# Patient Record
Sex: MALE | Race: WHITE | Employment: FULL TIME | URBAN - METROPOLITAN AREA
[De-identification: names, ages, dates, MRNs, and addresses within clinical notes are randomized per-mention and may not be internally consistent; named-entity substitution may affect disease eponyms.]

---

## 2020-07-29 ENCOUNTER — HOSPITAL ENCOUNTER (EMERGENCY)
Age: 35
Discharge: HOME | End: 2020-07-29
Attending: STUDENT IN AN ORGANIZED HEALTH CARE EDUCATION/TRAINING PROGRAM
Payer: COMMERCIAL

## 2020-07-29 VITALS
TEMPERATURE: 98.4 F | RESPIRATION RATE: 22 BRPM | BODY MASS INDEX: 23.01 KG/M2 | DIASTOLIC BLOOD PRESSURE: 64 MMHG | SYSTOLIC BLOOD PRESSURE: 108 MMHG | OXYGEN SATURATION: 97 % | HEIGHT: 70 IN | HEART RATE: 65 BPM | WEIGHT: 160.72 LBS

## 2020-07-29 DIAGNOSIS — T78.2XXA ANAPHYLAXIS, INITIAL ENCOUNTER: Primary | ICD-10-CM

## 2020-07-29 LAB
ALBUMIN SERPL-MCNC: 4.2 G/DL (ref 3.5–5)
ALBUMIN/GLOB SERPL: 1.2 {RATIO} (ref 1.1–2.2)
ALP SERPL-CCNC: 42 U/L (ref 45–117)
ALT SERPL-CCNC: 41 U/L (ref 12–78)
ANION GAP SERPL CALC-SCNC: 11 MMOL/L (ref 5–15)
AST SERPL-CCNC: 20 U/L (ref 15–37)
BASOPHILS # BLD: 0.1 K/UL (ref 0–0.1)
BASOPHILS NFR BLD: 1 % (ref 0–1)
BILIRUB SERPL-MCNC: 0.4 MG/DL (ref 0.2–1)
BUN SERPL-MCNC: 17 MG/DL (ref 6–20)
BUN/CREAT SERPL: 17 (ref 12–20)
CALCIUM SERPL-MCNC: 9.2 MG/DL (ref 8.5–10.1)
CHLORIDE SERPL-SCNC: 103 MMOL/L (ref 97–108)
CO2 SERPL-SCNC: 29 MMOL/L (ref 21–32)
CREAT SERPL-MCNC: 1.01 MG/DL (ref 0.7–1.3)
DIFFERENTIAL METHOD BLD: NORMAL
EOSINOPHIL # BLD: 0.3 K/UL (ref 0–0.4)
EOSINOPHIL NFR BLD: 3 % (ref 0–7)
ERYTHROCYTE [DISTWIDTH] IN BLOOD BY AUTOMATED COUNT: 11.9 % (ref 11.5–14.5)
GLOBULIN SER CALC-MCNC: 3.5 G/DL (ref 2–4)
GLUCOSE SERPL-MCNC: 101 MG/DL (ref 65–100)
HCT VFR BLD AUTO: 44.6 % (ref 36.6–50.3)
HGB BLD-MCNC: 15.2 G/DL (ref 12.1–17)
IMM GRANULOCYTES # BLD AUTO: 0 K/UL (ref 0–0.04)
IMM GRANULOCYTES NFR BLD AUTO: 0 % (ref 0–0.5)
LYMPHOCYTES # BLD: 3.1 K/UL (ref 0.8–3.5)
LYMPHOCYTES NFR BLD: 35 % (ref 12–49)
MAGNESIUM SERPL-MCNC: 2.3 MG/DL (ref 1.6–2.4)
MCH RBC QN AUTO: 30.7 PG (ref 26–34)
MCHC RBC AUTO-ENTMCNC: 34.1 G/DL (ref 30–36.5)
MCV RBC AUTO: 90.1 FL (ref 80–99)
MONOCYTES # BLD: 0.6 K/UL (ref 0–1)
MONOCYTES NFR BLD: 7 % (ref 5–13)
NEUTS SEG # BLD: 4.9 K/UL (ref 1.8–8)
NEUTS SEG NFR BLD: 54 % (ref 32–75)
NRBC # BLD: 0 K/UL (ref 0–0.01)
NRBC BLD-RTO: 0 PER 100 WBC
PLATELET # BLD AUTO: 343 K/UL (ref 150–400)
PMV BLD AUTO: 10 FL (ref 8.9–12.9)
POTASSIUM SERPL-SCNC: 3.5 MMOL/L (ref 3.5–5.1)
PROT SERPL-MCNC: 7.7 G/DL (ref 6.4–8.2)
RBC # BLD AUTO: 4.95 M/UL (ref 4.1–5.7)
SODIUM SERPL-SCNC: 143 MMOL/L (ref 136–145)
WBC # BLD AUTO: 9 K/UL (ref 4.1–11.1)

## 2020-07-29 PROCEDURE — 96374 THER/PROPH/DIAG INJ IV PUSH: CPT

## 2020-07-29 PROCEDURE — 99284 EMERGENCY DEPT VISIT MOD MDM: CPT

## 2020-07-29 PROCEDURE — 96375 TX/PRO/DX INJ NEW DRUG ADDON: CPT

## 2020-07-29 PROCEDURE — 36415 COLL VENOUS BLD VENIPUNCTURE: CPT

## 2020-07-29 PROCEDURE — 74011250636 HC RX REV CODE- 250/636: Performed by: STUDENT IN AN ORGANIZED HEALTH CARE EDUCATION/TRAINING PROGRAM

## 2020-07-29 PROCEDURE — 80053 COMPREHEN METABOLIC PANEL: CPT

## 2020-07-29 PROCEDURE — 83735 ASSAY OF MAGNESIUM: CPT

## 2020-07-29 PROCEDURE — 96372 THER/PROPH/DIAG INJ SC/IM: CPT

## 2020-07-29 PROCEDURE — 85025 COMPLETE CBC W/AUTO DIFF WBC: CPT

## 2020-07-29 RX ORDER — DIPHENHYDRAMINE HYDROCHLORIDE 50 MG/ML
25 INJECTION, SOLUTION INTRAMUSCULAR; INTRAVENOUS
Status: COMPLETED | OUTPATIENT
Start: 2020-07-29 | End: 2020-07-29

## 2020-07-29 RX ORDER — PREDNISONE 20 MG/1
60 TABLET ORAL
Qty: 12 TAB | Refills: 0 | Status: SHIPPED | OUTPATIENT
Start: 2020-07-29 | End: 2020-08-02

## 2020-07-29 RX ORDER — EPINEPHRINE 1 MG/ML
0.3 INJECTION, SOLUTION, CONCENTRATE INTRAVENOUS
Status: COMPLETED | OUTPATIENT
Start: 2020-07-29 | End: 2020-07-29

## 2020-07-29 RX ORDER — ONDANSETRON 2 MG/ML
4 INJECTION INTRAMUSCULAR; INTRAVENOUS
Status: COMPLETED | OUTPATIENT
Start: 2020-07-29 | End: 2020-07-29

## 2020-07-29 RX ORDER — EPINEPHRINE 0.3 MG/.3ML
0.3 INJECTION SUBCUTANEOUS
Qty: 1 SYRINGE | Refills: 0 | Status: SHIPPED | OUTPATIENT
Start: 2020-07-29 | End: 2020-07-29

## 2020-07-29 RX ADMIN — ONDANSETRON 4 MG: 2 INJECTION INTRAMUSCULAR; INTRAVENOUS at 00:22

## 2020-07-29 RX ADMIN — FAMOTIDINE 20 MG: 10 INJECTION, SOLUTION INTRAVENOUS at 00:37

## 2020-07-29 RX ADMIN — DIPHENHYDRAMINE HYDROCHLORIDE 25 MG: 50 INJECTION, SOLUTION INTRAMUSCULAR; INTRAVENOUS at 00:24

## 2020-07-29 RX ADMIN — SODIUM CHLORIDE 1000 ML: 900 INJECTION, SOLUTION INTRAVENOUS at 00:18

## 2020-07-29 RX ADMIN — EPINEPHRINE 0.3 MG: 1 INJECTION, SOLUTION INTRAMUSCULAR; SUBCUTANEOUS at 00:19

## 2020-07-29 RX ADMIN — METHYLPREDNISOLONE SODIUM SUCCINATE 125 MG: 125 INJECTION, POWDER, FOR SOLUTION INTRAMUSCULAR; INTRAVENOUS at 00:27

## 2020-07-29 NOTE — ED TRIAGE NOTES
Patient arrives for c/o, \"I. Think I'm having an allergic reaction to peanuts. \" Hx of the same, presents with hives, redness and vomiting. Denies SOB, no swelling noted. Ingested \"pretzels that may have peanuts\" at 2100 tonight.  50mg benadryl pta

## 2020-07-29 NOTE — ED PROVIDER NOTES
Chief Complaint   Patient presents with    Allergic Reaction     This is a 18-year-old male with a known peanut allergy presenting with multiple episodes of nausea and vomiting, persistent rash following an accidental peanut ingestion that occurred 3 hours prior to arrival.  He ate some pretzels that he did not realize had peanuts in them, shortly thereafter began vomiting, developed a pruritic rash across his trunk and upper extremities, neck, that appeared, went away and then recurred. No fevers, no difficulty breathing, he denies any sensation of his throat swelling or closing up, has no difficulty swallowing. Reports epigastric pain which he feels is secondary to repeated emesis. No other recent changes in his health. He did take some Benadryl when his symptoms started initially but without any relief. No other systemic complaints. Symptoms are moderate in nature without any alleviating or exacerbating factors. History reviewed. No pertinent past medical history. Past Surgical History:   Procedure Laterality Date    HX ACL RECONSTRUCTION           History reviewed. No pertinent family history.     Social History     Socioeconomic History    Marital status:      Spouse name: Not on file    Number of children: Not on file    Years of education: Not on file    Highest education level: Not on file   Occupational History    Not on file   Social Needs    Financial resource strain: Not on file    Food insecurity     Worry: Not on file     Inability: Not on file    Transportation needs     Medical: Not on file     Non-medical: Not on file   Tobacco Use    Smoking status: Never Smoker   Substance and Sexual Activity    Alcohol use: Not Currently    Drug use: Not on file    Sexual activity: Not on file   Lifestyle    Physical activity     Days per week: Not on file     Minutes per session: Not on file    Stress: Not on file   Relationships    Social connections     Talks on phone: Not on file     Gets together: Not on file     Attends Judaism service: Not on file     Active member of club or organization: Not on file     Attends meetings of clubs or organizations: Not on file     Relationship status: Not on file    Intimate partner violence     Fear of current or ex partner: Not on file     Emotionally abused: Not on file     Physically abused: Not on file     Forced sexual activity: Not on file   Other Topics Concern    Not on file   Social History Narrative    Not on file         ALLERGIES: Peanuts [peanut oil]    Review of Systems   Constitutional: Negative for fever. HENT: Negative for trouble swallowing. Eyes: Negative for redness. Respiratory: Negative for shortness of breath. Cardiovascular: Negative for chest pain. Gastrointestinal: Positive for abdominal pain and vomiting. Genitourinary: Negative for difficulty urinating. Musculoskeletal: Negative for neck stiffness. Skin: Positive for rash. Psychiatric/Behavioral: Negative for confusion.        Vitals:    07/29/20 0005 07/29/20 0019 07/29/20 0045 07/29/20 0200   BP: 121/81 109/73 106/66 108/64   Pulse: 97 71 69 65   Resp: 16  23 22   Temp: 98.3 °F (36.8 °C)   98.4 °F (36.9 °C)   SpO2: 97%  94% 97%   Weight: 72.9 kg (160 lb 11.5 oz)      Height: 5' 10\" (1.778 m)               Physical Exam  General:  Awake and alert, NAD  HEENT:  NC/AT, equal pupils, moist mucous membranes, oropharynx is clear without any oral lesions, no uvular edema  Neck:   Normal inspection, full range of motion  Cardiac:  RRR, no murmurs  Respiratory:  Clear bilaterally, no wheezes, rales, rhonchi  Abdomen:  Soft and nondistended, minimally tender in the epigastric region without guarding  Extremities: Warm and well perfused, no peripheral edema  Neuro:  Moving all extremities symmetrically without gross motor deficit  Skin:   +Urticarial rash across the neck and upper extremity    RESULTS  Recent Results (from the past 12 hour(s))   CBC WITH AUTOMATED DIFF    Collection Time: 07/29/20 12:16 AM   Result Value Ref Range    WBC 9.0 4.1 - 11.1 K/uL    RBC 4.95 4.10 - 5.70 M/uL    HGB 15.2 12.1 - 17.0 g/dL    HCT 44.6 36.6 - 50.3 %    MCV 90.1 80.0 - 99.0 FL    MCH 30.7 26.0 - 34.0 PG    MCHC 34.1 30.0 - 36.5 g/dL    RDW 11.9 11.5 - 14.5 %    PLATELET 952 718 - 050 K/uL    MPV 10.0 8.9 - 12.9 FL    NRBC 0.0 0  WBC    ABSOLUTE NRBC 0.00 0.00 - 0.01 K/uL    NEUTROPHILS 54 32 - 75 %    LYMPHOCYTES 35 12 - 49 %    MONOCYTES 7 5 - 13 %    EOSINOPHILS 3 0 - 7 %    BASOPHILS 1 0 - 1 %    IMMATURE GRANULOCYTES 0 0.0 - 0.5 %    ABS. NEUTROPHILS 4.9 1.8 - 8.0 K/UL    ABS. LYMPHOCYTES 3.1 0.8 - 3.5 K/UL    ABS. MONOCYTES 0.6 0.0 - 1.0 K/UL    ABS. EOSINOPHILS 0.3 0.0 - 0.4 K/UL    ABS. BASOPHILS 0.1 0.0 - 0.1 K/UL    ABS. IMM. GRANS. 0.0 0.00 - 0.04 K/UL    DF AUTOMATED     MAGNESIUM    Collection Time: 07/29/20 12:16 AM   Result Value Ref Range    Magnesium 2.3 1.6 - 2.4 mg/dL   METABOLIC PANEL, COMPREHENSIVE    Collection Time: 07/29/20 12:16 AM   Result Value Ref Range    Sodium 143 136 - 145 mmol/L    Potassium 3.5 3.5 - 5.1 mmol/L    Chloride 103 97 - 108 mmol/L    CO2 29 21 - 32 mmol/L    Anion gap 11 5 - 15 mmol/L    Glucose 101 (H) 65 - 100 mg/dL    BUN 17 6 - 20 MG/DL    Creatinine 1.01 0.70 - 1.30 MG/DL    BUN/Creatinine ratio 17 12 - 20      GFR est AA >60 >60 ml/min/1.73m2    GFR est non-AA >60 >60 ml/min/1.73m2    Calcium 9.2 8.5 - 10.1 MG/DL    Bilirubin, total 0.4 0.2 - 1.0 MG/DL    ALT (SGPT) 41 12 - 78 U/L    AST (SGOT) 20 15 - 37 U/L    Alk. phosphatase 42 (L) 45 - 117 U/L    Protein, total 7.7 6.4 - 8.2 g/dL    Albumin 4.2 3.5 - 5.0 g/dL    Globulin 3.5 2.0 - 4.0 g/dL    A-G Ratio 1.2 1.1 - 2.2          IMAGING  No results found.     CRITICAL CARE (ASAP ONLY)  Performed by: Maximiliano Taylor MD  Authorized by: Maximiliano Taylor MD     Critical care provider statement:     Critical care time (minutes):  35    Critical care time was exclusive of: Separately billable procedures and treating other patients    Critical care was necessary to treat or prevent imminent or life-threatening deterioration of the following conditions: Anaphylaxis. Critical care was time spent personally by me on the following activities:  Discussions with consultants, evaluation of patient's response to treatment, examination of patient, ordering and performing treatments and interventions, ordering and review of laboratory studies, ordering and review of radiographic studies, pulse oximetry and re-evaluation of patient's condition         ED course: IM epinephrine 0.3 mg, IV Solumedrol, Benadryl and Pepcid given with improvement. Rash resolving. No new symptoms while being observed in the department over the last 2 hours, no respiratory symptoms. No additional emesis. Will have him continue prednisone and Benadryl for the next several days, follow up with a primary physician to be reevaluated, prescription for Epipen provided as well. Will discharge home.     Impression: Anaphylaxis  Disposition: Discharge home

## 2020-07-29 NOTE — DISCHARGE INSTRUCTIONS
- Continue the prednisone as prescribed (next dose tomorrow) and use Benadryl every 6 hours as needed for symptoms of allergic reaction  - Administer EpiPen for symptoms of anaphylaxis as discussed  - Return to the ER immediately for worsening throat pain or swelling, changes in your voice, difficulty breathing, vomiting or diarrhea, or worsening rash  - Follow up with your primary physician to be reevaluated as soon as possible

## 2020-07-29 NOTE — ED NOTES
Patient resting in stretcher, reports relief of symptoms at this time, decrease in hives noted. IVF infusing as ordered, lights dimmed and blanket given for comfort. Call bell in reach.

## 2022-07-07 ENCOUNTER — OFFICE VISIT (OUTPATIENT)
Dept: ORTHOPEDIC SURGERY | Age: 37
End: 2022-07-07
Payer: COMMERCIAL

## 2022-07-07 VITALS — HEIGHT: 70 IN | BODY MASS INDEX: 21.47 KG/M2 | WEIGHT: 150 LBS

## 2022-07-07 DIAGNOSIS — S93.402S MODERATE LEFT ANKLE SPRAIN, SEQUELA: ICD-10-CM

## 2022-07-07 DIAGNOSIS — G89.29 CHRONIC PAIN OF LEFT ANKLE: Primary | ICD-10-CM

## 2022-07-07 DIAGNOSIS — M25.572 CHRONIC PAIN OF LEFT ANKLE: Primary | ICD-10-CM

## 2022-07-07 PROCEDURE — 99203 OFFICE O/P NEW LOW 30 MIN: CPT | Performed by: ORTHOPAEDIC SURGERY

## 2022-07-07 NOTE — PROGRESS NOTES
Yazmin Álvarez (: 1985) is a 29 y.o. male, patient,here for evaluation of the following   Chief Complaint   Patient presents with    Ankle Pain     left        ASSESSMENT/PLAN:  Below is the assessment and plan developed based on review of pertinent history, physical exam, labs, studies, and medications. 1. Chronic pain of left ankle  -     REFERRAL TO PHYSICAL THERAPY  -     MRI ANKLE LT WO CONT; Future  2. Moderate left ankle sprain, sequela  -     REFERRAL TO PHYSICAL THERAPY  -     MRI ANKLE LT WO CONT; Future    Patient has persistent pain to the left ankle and he is near 3 months since date of an injury to this ankle described as a sprain. Based on exam, consistent with a possible moderate sprain. He is still sore and swelling with increased activities. Because of time since injury which was in 2022, and he still symptomatic, I did recommend an MRI without contrast of left ankle. Did refer back to physical therapist to continue for modalities for pain and swelling and range of motion exercises as he does have loss of range of motion. He has completed 11 visits physical therapy and wa last seen on 2022. He brought the physical therapist report with him today and that has been reviewed. We will focus on modalities for pain and swelling, strength, proprioception/balance and range of motion exercises. We will await the MRI and once completed the patient will return to review results and treatment pending the results. We are looking for anything else that would be causing persistent pain such as loose body, osteochondral lesions, fractures that are nondisplaced, ankle impingement syndrome, and/or tendinitis/tendon tears. When he returns next we will get new x-rays left ankle 3 views weightbearing compared to contralateral ankle on AP view.     I spent approximately 25 to 30 minutes of face time with patient discussing the exam results, the x-rays reviewed, and answered all his questions pertaining to this injury. In addition, retrieving studies brought by patient from outside facility for my review prior to examination. Return for Review MRI when complete, treatment as indicated. .      Allergies   Allergen Reactions    Avocado Other (comments)     Tingling in mouth    Banana Unknown (comments)     tingling in mouth    Egg Unknown (comments)     Egg based vaccines    Peanuts [Peanut Oil] Itching       No current outpatient medications on file. No current facility-administered medications for this visit. History reviewed. No pertinent past medical history. Past Surgical History:   Procedure Laterality Date    HX ACL RECONSTRUCTION         History reviewed. No pertinent family history. Social History     Socioeconomic History    Marital status:      Spouse name: Not on file    Number of children: Not on file    Years of education: Not on file    Highest education level: Not on file   Occupational History    Not on file   Tobacco Use    Smoking status: Never Smoker    Smokeless tobacco: Never Used   Vaping Use    Vaping Use: Never used   Substance and Sexual Activity    Alcohol use: Not Currently    Drug use: Never    Sexual activity: Yes   Other Topics Concern    Not on file   Social History Narrative    Not on file     Social Determinants of Health     Financial Resource Strain:     Difficulty of Paying Living Expenses: Not on file   Food Insecurity:     Worried About Running Out of Food in the Last Year: Not on file    Darshana of Food in the Last Year: Not on file   Transportation Needs:     Lack of Transportation (Medical): Not on file    Lack of Transportation (Non-Medical):  Not on file   Physical Activity:     Days of Exercise per Week: Not on file    Minutes of Exercise per Session: Not on file   Stress:     Feeling of Stress : Not on file   Social Connections:     Frequency of Communication with Friends and Family: Not on file    Frequency of Social Gatherings with Friends and Family: Not on file    Attends Latter day Services: Not on file    Active Member of Clubs or Organizations: Not on file    Attends Club or Organization Meetings: Not on file    Marital Status: Not on file   Intimate Partner Violence:     Fear of Current or Ex-Partner: Not on file    Emotionally Abused: Not on file    Physically Abused: Not on file    Sexually Abused: Not on file   Housing Stability:     Unable to Pay for Housing in the Last Year: Not on file    Number of [place] in the Last Year: Not on file    Unstable Housing in the Last Year: Not on file           Vitals:  Ht 5' 10\" (1.778 m)   Wt 150 lb (68 kg)   BMI 21.52 kg/m²    Body mass index is 21.52 kg/m². SUBJECTIVE/OBJECTIVE:  Gala Dimas (: 1985)   New patient presents today with history of left ankle sprain on 2022 and states he continues to have pain. He went to Ortho on-call after this injury and still having trouble. This injury happened while playing tennis. Current pain is moderate to severe sharp stabbing pain that comes and goes. There is still swelling and weakness sensation. Bending, twisting, squatting, running walking make it worse. Patient denies any history of diabetes, non-smoker. Patient has undergone some physical therapy program and was seen last 2022. Because of persistent pain, patient is referred for further evaluation. Physical Exam  Pleasant well-nourished male , alert and oriented to person, time and place, no acute distress. Mild antalgic gait, satisfactory weightbearing stance. Left lower extremity/ankle: Calf soft nontender, range of motion is satisfactory at 0 to 30 degrees, there is mild tenderness anterior lateral ankle, medial ankle joint, there is no joint effusion, minimal swelling. No gross instability for anterior drawer and lateral talar tilt stress.   There is also some tenderness along the peroneal tendons of ankle. Achilles tendon has slight tightness with a positive Silfverskiold test but nontender. The tendon is intact with a negative Alanis test.    Left foot: Nontender, no swelling, ligaments grossly stable. Able to flex and extend toes satisfaction range of motion and strength. Contralateral foot and ankle exam, nontender, no swelling ligaments grossly stable. Normal weightbearing stance. Neurovascular exam intact for light touch sensation, cap refill, dorsalis pedis pulse palpable, flexion/extension strength 5/5. Skin intact without open wounds, lesions or ulcers, no skin discolorations, normal warmth to skin. Imaging:    Patient brought disc from outside facility, these films were transitioned to PACS and they are of the left ankle 3 views nonweightbearing x-rays show no acute fractures or dislocations, no acute abnormalities. An electronic signature was used to authenticate this note.   -- Nydia Brunson MD

## 2022-07-11 ENCOUNTER — TELEPHONE (OUTPATIENT)
Dept: ORTHOPEDIC SURGERY | Age: 37
End: 2022-07-11

## 2022-07-18 ENCOUNTER — HOSPITAL ENCOUNTER (OUTPATIENT)
Dept: MRI IMAGING | Age: 37
Discharge: HOME | End: 2022-07-18
Attending: ORTHOPAEDIC SURGERY
Payer: COMMERCIAL

## 2022-07-18 DIAGNOSIS — G89.29 CHRONIC PAIN OF LEFT ANKLE: ICD-10-CM

## 2022-07-18 DIAGNOSIS — M25.572 CHRONIC PAIN OF LEFT ANKLE: ICD-10-CM

## 2022-07-18 DIAGNOSIS — S93.402S MODERATE LEFT ANKLE SPRAIN, SEQUELA: ICD-10-CM

## 2022-07-18 PROCEDURE — 73721 MRI JNT OF LWR EXTRE W/O DYE: CPT

## 2022-07-28 ENCOUNTER — OFFICE VISIT (OUTPATIENT)
Dept: ORTHOPEDIC SURGERY | Age: 37
End: 2022-07-28
Payer: COMMERCIAL

## 2022-07-28 VITALS — BODY MASS INDEX: 21.47 KG/M2 | WEIGHT: 150 LBS | HEIGHT: 70 IN

## 2022-07-28 DIAGNOSIS — M25.472 PAIN AND SWELLING OF LEFT ANKLE: Primary | ICD-10-CM

## 2022-07-28 DIAGNOSIS — S93.402S MODERATE LEFT ANKLE SPRAIN, SEQUELA: ICD-10-CM

## 2022-07-28 DIAGNOSIS — M25.572 PAIN AND SWELLING OF LEFT ANKLE: Primary | ICD-10-CM

## 2022-07-28 PROCEDURE — 99213 OFFICE O/P EST LOW 20 MIN: CPT | Performed by: ORTHOPAEDIC SURGERY

## 2022-07-28 NOTE — PROGRESS NOTES
Kaley Nelson (: 1985) is a 29 y.o. male, patient,here for evaluation of the following   Chief Complaint   Patient presents with    Ankle Pain     Left ankle mri results        ASSESSMENT/PLAN:  Below is the assessment and plan developed based on review of pertinent history, physical exam, labs, studies, and medications. 1. Pain and swelling of left ankle  2. Moderate left ankle sprain, sequela    Overall patient is doing better, I did review the MRI with him at length discussing the findings on the MRI. The next step in treatment is to maximize conservative treatment since there are no significant findings that would warrant surgical treatment at this point. I had recommended physical therapy program and he will continue with that treatment. I had also offered Medrol pack treatment for the swelling but he declined but states if he decides to use it in the future, he can call our office and we can prescribe the medication which is the Medrol pack treatment for anti-inflammatory medication treatment. He states for now he will continue with ibuprofen unless he calls for the Medrol pack treatment. No current surgical indications. Meantime, he is informed of the findings of MRI reviewed today and I have answered all his questions pertaining to the treatment and the MRI findings. I anticipate that at some point he will continue to recover and on my exam I did not find any significant instability to the ankle. Activities as tolerated to progress slowly and allow this to recover. Return if symptoms worsen or fail to improve. Allergies   Allergen Reactions    Avocado Other (comments)     Tingling in mouth    Banana Unknown (comments)     tingling in mouth    Egg Unknown (comments)     Egg based vaccines    Peanuts [Peanut Oil] Itching       No current outpatient medications on file. No current facility-administered medications for this visit. No past medical history on file.     Past Surgical History:   Procedure Laterality Date    HX ACL RECONSTRUCTION         No family history on file. Social History     Socioeconomic History    Marital status:      Spouse name: Not on file    Number of children: Not on file    Years of education: Not on file    Highest education level: Not on file   Occupational History    Not on file   Tobacco Use    Smoking status: Never    Smokeless tobacco: Never   Vaping Use    Vaping Use: Never used   Substance and Sexual Activity    Alcohol use: Not Currently    Drug use: Never    Sexual activity: Yes   Other Topics Concern    Not on file   Social History Narrative    Not on file     Social Determinants of Health     Financial Resource Strain: Not on file   Food Insecurity: Not on file   Transportation Needs: Not on file   Physical Activity: Not on file   Stress: Not on file   Social Connections: Not on file   Intimate Partner Violence: Not on file   Housing Stability: Not on file           Vitals:  Ht 5' 10\" (1.778 m)   Wt 150 lb (68 kg)   BMI 21.52 kg/m²    Body mass index is 21.52 kg/m². SUBJECTIVE/OBJECTIVE:  Ale Jeronimo (: 1985)   Patient back for review of MRI of left ankle, he had an injury around 14 weeks ago which was considered a severe ankle sprain, he had a lot of symptoms and concern of injury that was more than just an ankle sprain so we referred for MRI. MRI is completed recently on 2022 and patient back for review and discussion about next step in treatment. Physical Exam  Pleasant well-nourished male , alert and oriented to person, time and place, no acute distress. Mild antalgic gait, satisfactory weightbearing stance. Left lower extremity/ankle: Overall there appears to be some improvement since last seen, there is less swelling and he has satisfactory to motion, there is still tenderness around the ATFL, CFL, anterior tibial fibular ligament also mildly tender, deltoid ligament is less tender. Medial and lateral malleolus nontender, Achilles tendon intact with negative Alanis test, negative ankle squeeze test.  Range of motion intact without significant pain, no joint effusions. Left foot: Nontender, no swelling, ligament stable. Neurovascular exam intact for light touch sensation, cap refill, dorsalis pedis pulse palpable, flexion/extension strength 5/5. Skin intact without open wounds, lesions or ulcers, no skin discolorations, normal warmth to skin. Imaging:    No x-rays obtained today, previous x-rays did not show an obvious fracture or dislocation, there was a normal ankle mortise present. No obvious osteochondral lesion seen. MRI dated July 18, 2022 is reviewed on PACS and it confirms a grade sprain with injuries to the deltoid ligament, ATFL, CFL. But no significant tears were seen. The tibiofibular ligament was otherwise intact. There appeared to be possibly an incomplete tear of her peroneal brevis tendon versus a congenital variant that is not a tear. (Patient has no symptoms along the peroneal tendons). The MRI also shows persistent contusions to the fibula, tibia and talus. No fractures or dislocations. The radiology report confirms these findings, detailed radiology report in chart. Summary is below. IMPRESSION  1. Moderate sprain of the deltoid ligament, ATFL, and CFL. No full-thickness  tear. Intact tibiofibular ligaments. 2. 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 3. Bone contusions versus stress reaction in the fibula, tibia, and talus. An electronic signature was used to authenticate this note.   -- Jammie Grimaldo MD

## 2022-10-27 ENCOUNTER — HOSPITAL ENCOUNTER (EMERGENCY)
Age: 37
Discharge: HOME | End: 2022-10-27
Attending: EMERGENCY MEDICINE
Payer: COMMERCIAL

## 2022-10-27 VITALS
SYSTOLIC BLOOD PRESSURE: 116 MMHG | RESPIRATION RATE: 19 BRPM | BODY MASS INDEX: 22.6 KG/M2 | HEART RATE: 63 BPM | DIASTOLIC BLOOD PRESSURE: 75 MMHG | TEMPERATURE: 98.1 F | HEIGHT: 70 IN | WEIGHT: 157.85 LBS | OXYGEN SATURATION: 98 %

## 2022-10-27 DIAGNOSIS — T78.40XA ALLERGIC REACTION, INITIAL ENCOUNTER: Primary | ICD-10-CM

## 2022-10-27 LAB
ALBUMIN SERPL-MCNC: 4.2 G/DL (ref 3.5–5)
ALBUMIN/GLOB SERPL: 1.2 {RATIO} (ref 1.1–2.2)
ALP SERPL-CCNC: 48 U/L (ref 45–117)
ALT SERPL-CCNC: 20 U/L (ref 12–78)
ANION GAP SERPL CALC-SCNC: 12 MMOL/L (ref 5–15)
AST SERPL-CCNC: 17 U/L (ref 15–37)
BASOPHILS # BLD: 0 K/UL (ref 0–0.1)
BASOPHILS NFR BLD: 0 % (ref 0–1)
BILIRUB SERPL-MCNC: 0.3 MG/DL (ref 0.2–1)
BUN SERPL-MCNC: 21 MG/DL (ref 6–20)
BUN/CREAT SERPL: 23 (ref 12–20)
CALCIUM SERPL-MCNC: 9.2 MG/DL (ref 8.5–10.1)
CHLORIDE SERPL-SCNC: 103 MMOL/L (ref 97–108)
CO2 SERPL-SCNC: 24 MMOL/L (ref 21–32)
CREAT SERPL-MCNC: 0.91 MG/DL (ref 0.7–1.3)
DIFFERENTIAL METHOD BLD: ABNORMAL
EOSINOPHIL # BLD: 0.1 K/UL (ref 0–0.4)
EOSINOPHIL NFR BLD: 1 % (ref 0–7)
ERYTHROCYTE [DISTWIDTH] IN BLOOD BY AUTOMATED COUNT: 11.8 % (ref 11.5–14.5)
GLOBULIN SER CALC-MCNC: 3.5 G/DL (ref 2–4)
GLUCOSE SERPL-MCNC: 128 MG/DL (ref 65–100)
HCT VFR BLD AUTO: 47.4 % (ref 36.6–50.3)
HGB BLD-MCNC: 16.8 G/DL (ref 12.1–17)
IMM GRANULOCYTES # BLD AUTO: 0.1 K/UL (ref 0–0.04)
IMM GRANULOCYTES NFR BLD AUTO: 0 % (ref 0–0.5)
LYMPHOCYTES # BLD: 2.1 K/UL (ref 0.8–3.5)
LYMPHOCYTES NFR BLD: 17 % (ref 12–49)
MCH RBC QN AUTO: 30.8 PG (ref 26–34)
MCHC RBC AUTO-ENTMCNC: 35.4 G/DL (ref 30–36.5)
MCV RBC AUTO: 86.8 FL (ref 80–99)
MONOCYTES # BLD: 0.5 K/UL (ref 0–1)
MONOCYTES NFR BLD: 4 % (ref 5–13)
NEUTS SEG # BLD: 9.2 K/UL (ref 1.8–8)
NEUTS SEG NFR BLD: 78 % (ref 32–75)
NRBC # BLD: 0 K/UL (ref 0–0.01)
NRBC BLD-RTO: 0 PER 100 WBC
PLATELET # BLD AUTO: 411 K/UL (ref 150–400)
PMV BLD AUTO: 10.5 FL (ref 8.9–12.9)
POTASSIUM SERPL-SCNC: 3.9 MMOL/L (ref 3.5–5.1)
PROT SERPL-MCNC: 7.7 G/DL (ref 6.4–8.2)
RBC # BLD AUTO: 5.46 M/UL (ref 4.1–5.7)
SODIUM SERPL-SCNC: 139 MMOL/L (ref 136–145)
WBC # BLD AUTO: 12 K/UL (ref 4.1–11.1)

## 2022-10-27 PROCEDURE — 96374 THER/PROPH/DIAG INJ IV PUSH: CPT

## 2022-10-27 PROCEDURE — 80053 COMPREHEN METABOLIC PANEL: CPT

## 2022-10-27 PROCEDURE — 99284 EMERGENCY DEPT VISIT MOD MDM: CPT

## 2022-10-27 PROCEDURE — 74011250636 HC RX REV CODE- 250/636: Performed by: EMERGENCY MEDICINE

## 2022-10-27 PROCEDURE — 36415 COLL VENOUS BLD VENIPUNCTURE: CPT

## 2022-10-27 PROCEDURE — 96375 TX/PRO/DX INJ NEW DRUG ADDON: CPT

## 2022-10-27 PROCEDURE — 85025 COMPLETE CBC W/AUTO DIFF WBC: CPT

## 2022-10-27 RX ORDER — EPINEPHRINE 0.3 MG/.3ML
0.3 INJECTION SUBCUTANEOUS
Qty: 2 EACH | Refills: 0 | Status: SHIPPED | OUTPATIENT
Start: 2022-10-27 | End: 2022-10-27

## 2022-10-27 RX ORDER — EPINEPHRINE 1 MG/ML
0.3 INJECTION, SOLUTION, CONCENTRATE INTRAVENOUS
Status: DISCONTINUED | OUTPATIENT
Start: 2022-10-27 | End: 2022-10-27

## 2022-10-27 RX ORDER — DIPHENHYDRAMINE HYDROCHLORIDE 50 MG/ML
50 INJECTION, SOLUTION INTRAMUSCULAR; INTRAVENOUS ONCE
Status: COMPLETED | OUTPATIENT
Start: 2022-10-27 | End: 2022-10-27

## 2022-10-27 RX ADMIN — DIPHENHYDRAMINE HYDROCHLORIDE 50 MG: 50 INJECTION INTRAMUSCULAR; INTRAVENOUS at 21:29

## 2022-10-27 RX ADMIN — SODIUM CHLORIDE 1000 ML: 9 INJECTION, SOLUTION INTRAVENOUS at 21:25

## 2022-10-27 RX ADMIN — METHYLPREDNISOLONE SODIUM SUCCINATE 125 MG: 125 INJECTION, POWDER, FOR SOLUTION INTRAMUSCULAR; INTRAVENOUS at 21:25

## 2022-10-28 NOTE — DISCHARGE INSTRUCTIONS
Return to the emergency department for any new or worsening symptoms. If later you administer your EpiPen you need to come straight to the emergency department. You can also take 25 mg of Benadryl every 6 hours as needed for any recurrent rash.

## 2022-10-28 NOTE — ED TRIAGE NOTES
Pt reports an allergic reaction that started around 7:30pm, took prednisolon cortico 10mg, an epi pen for 16-33lbs, and levocetirizine drops. Pt was seen by EMS, had diarrhea, vomited once prior to taking medications. Now having secondary reaction of itching, hives.

## 2022-10-28 NOTE — ED PROVIDER NOTES
HPI   71-year-old man with a past medical history of multiple allergies causing anaphylaxis who presents to the emergency department due to concern for an allergic reaction. Around 730 patient developed an allergic reaction that he says consisted of abdominal pain, diarrhea and then a rash on his trunk and extremities. He took 10 mg of prednisolone and use what sounds like an EpiPen Shree. This helped his symptoms but they returned. He developed worsening of the rash. The GI symptoms have resolved. He says earlier his throat felt tight and scratchy but that resolved as well. He denies any difficulty breathing. He is not sure what he was allergic to tonight. Apparently EMS also saw the patient initially but since he got better he declined transport. He currently does not have an EpiPen at home. He has not taken any Benadryl today. History reviewed. No pertinent past medical history. Past Surgical History:   Procedure Laterality Date    HX ACL RECONSTRUCTION           History reviewed. No pertinent family history.     Social History     Socioeconomic History    Marital status:      Spouse name: Not on file    Number of children: Not on file    Years of education: Not on file    Highest education level: Not on file   Occupational History    Not on file   Tobacco Use    Smoking status: Never    Smokeless tobacco: Never   Vaping Use    Vaping Use: Never used   Substance and Sexual Activity    Alcohol use: Yes     Comment: a few times a week    Drug use: Never    Sexual activity: Yes   Other Topics Concern    Not on file   Social History Narrative    Not on file     Social Determinants of Health     Financial Resource Strain: Not on file   Food Insecurity: Not on file   Transportation Needs: Not on file   Physical Activity: Not on file   Stress: Not on file   Social Connections: Not on file   Intimate Partner Violence: Not on file   Housing Stability: Not on file         ALLERGIES: Avocado, Banana, Egg, and Peanuts [peanut oil]    Review of Systems  A complete review of systems was performed and all systems reviewed are negative unless otherwise documented in the HPI    There were no vitals filed for this visit. Physical Exam  Constitutional:       Comments: Patient appears slightly anxious but nontoxic and not diaphoretic   HENT:      Mouth/Throat:      Comments: No appreciable oropharyngeal swelling  Eyes:      General: No scleral icterus. Extraocular Movements: Extraocular movements intact. Neck:      Comments: Trachea midline  Cardiovascular:      Comments: Tachycardic. Regular rhythm. Normal capillary refill  Pulmonary:      Effort: Pulmonary effort is normal. No respiratory distress. Breath sounds: Normal breath sounds. No wheezing or rales. Abdominal:      General: There is no distension. Palpations: Abdomen is soft. Tenderness: There is no abdominal tenderness. Musculoskeletal:         General: No deformity. Normal range of motion. Cervical back: Normal range of motion. Right lower leg: No edema. Left lower leg: No edema. Skin:     General: Skin is warm and dry. Comments: Diffuse urticaria on the bilateral upper extremities as well as the trunk   Neurological:      Comments: Awake and alert. GCS 15        MDM  60-year-old man who presents with the above chief complaint. Initial vital signs show pulse rate of 121 and a blood pressure of 88/60. This was obtained in triage and repeat blood pressure is 99 systolic. On exam he has no wheezing but has diffuse urticaria all over his trunk as well as his extremities. Though he seems to have only 1 organ system involved on exam, given his hypotension epinephrine was ordered. By the time the patient was roomed his hypotension resolved. He says that shortly after receiving Benadryl, his symptoms essentially resolved. He was monitored for few hours with no development of new symptoms.   His blood pressure improved to 039 systolic and his tachycardia resolved. He was also given some methylprednisolone. He requested discharge. I prescribed the patient EpiPen's and he understands he needs to come back to the ER if he needs to use this or he develops any other symptoms. Patient discharged in stable condition.        Procedures